# Patient Record
Sex: FEMALE | Race: WHITE | Employment: FULL TIME | URBAN - METROPOLITAN AREA
[De-identification: names, ages, dates, MRNs, and addresses within clinical notes are randomized per-mention and may not be internally consistent; named-entity substitution may affect disease eponyms.]

---

## 2017-02-13 ENCOUNTER — OFFICE VISIT (OUTPATIENT)
Dept: URGENT CARE | Facility: RETAIL CLINIC | Age: 47
End: 2017-02-13
Payer: COMMERCIAL

## 2017-02-13 VITALS — SYSTOLIC BLOOD PRESSURE: 112 MMHG | TEMPERATURE: 97.9 F | HEART RATE: 61 BPM | DIASTOLIC BLOOD PRESSURE: 69 MMHG

## 2017-02-13 DIAGNOSIS — G44.209 TENSION HEADACHE: Primary | ICD-10-CM

## 2017-02-13 DIAGNOSIS — H69.91 DYSFUNCTION OF EUSTACHIAN TUBE, RIGHT: ICD-10-CM

## 2017-02-13 PROCEDURE — 99202 OFFICE O/P NEW SF 15 MIN: CPT | Performed by: PHYSICIAN ASSISTANT

## 2017-02-13 NOTE — NURSING NOTE
Chief Complaint   Patient presents with     Otalgia     symptoms worsened the past 3 days; has had a cold; Right     Sinus Problem     Right     Generalized Body Aches     Right side       Initial /69 (BP Location: Left arm)  Pulse 61  Temp 97.9  F (36.6  C) (Oral) There is no height or weight on file to calculate BMI.  Medication Reconciliation: complete

## 2017-02-13 NOTE — MR AVS SNAPSHOT
"              After Visit Summary   2017    Kate Green    MRN: [id number]           Patient Information     Date Of Birth          1970        Visit Information        Provider Department      2017 12:20 PM Debbie Kim PA-C [facility] [place]        Today's Diagnoses     Tension headache    -  1    Dysfunction of eustachian tube, right           Follow-ups after your visit        Who to contact     You can reach your care team any time of the day by calling [phone].  Notification of test results:  If you have an abnormal lab result, we will notify you by phone as soon as possible.         Additional Information About Your Visit        MyChart Information     [employer] lets you send messages to your doctor, view your test results, renew your prescriptions, schedule appointments and more. To sign up, go to [email or web address] . Click on \"Log in\" on the left side of the screen, which will take you to the Welcome page. Then click on \"Sign up Now\" on the right side of the page.     You will be asked to enter the access code listed below, as well as some personal information. Please follow the directions to create your username and password.     Your access code is: [email or web address]  Expires: 2017  1:45 PM     Your access code will  in 90 days. If you need help or a new code, please call your [place] clinic or [phone].        Care EveryWhere ID     This is your Care EveryWhere ID. This could be used by other organizations to access your [place] medical records  [id number]        Your Vitals Were     Pulse Temperature                61 97.9  F (36.6  C) (Oral)           Blood Pressure from Last 3 Encounters:   17 112/69    Weight from Last 3 Encounters:   No data found for Wt              Today, you had the following     No orders found for display       Primary Care Provider    None Specified       No primary provider on file.        Thank you!     " Thank you for choosing [facility]  for your care. Our goal is always to provide you with excellent care. Hearing back from our patients is one way we can continue to improve our services. Please take a few minutes to complete the written survey that you may receive in the mail after your visit with us. Thank you!             Your Updated Medication List - Protect others around you: Learn how to safely use, store and throw away your medicines at www.disposemymeds.org.          This list is accurate as of: 2/13/17  1:45 PM.  Always use your most recent med list.                   Brand Name Dispense Instructions for use    IBUPROFEN PO          SYNTHROID PO          TYLENOL PO          ZOLOFT PO      Take by mouth daily

## 2017-02-13 NOTE — PROGRESS NOTES
Chief Complaint   Patient presents with     Otalgia     symptoms worsened the past 3 days; has had a cold; Right     Sinus Problem     Right     Generalized Body Aches     Right side        SUBJECTIVE:   Kate Green is a 46 year old female presenting with a chief complaint of pressure on R lateral side of head  Onset of symptoms was 2 day(s) ago.  Course of illness is same.    Severity ranges from mild- moderate  Current and Associated symptoms headache, R ear pressure,  achy, cold sxs for past weeks  Treatment measures tried include tylenol, ibuprofen  Predisposing factors include headaches, goes to chiropractor for adjustments, has had a few AOMs in past as an adult    No past medical history on file.  Current Outpatient Prescriptions   Medication Sig Dispense Refill     Sertraline HCl (ZOLOFT PO) Take by mouth daily       Levothyroxine Sodium (SYNTHROID PO)        Acetaminophen (TYLENOL PO)        IBUPROFEN PO         No Known Allergies     Social History   Substance Use Topics     Smoking status: Not on file     Smokeless tobacco: Not on file     Alcohol use Not on file       ROS:  Review of systems negative except as stated above.    OBJECTIVE:  /69 (BP Location: Left arm)  Pulse 61  Temp 97.9  F (36.6  C) (Oral)  GENERAL APPEARANCE: healthy, alert and no distress  EYES: conjunctiva clear  HENT: pressure points along head. ear canals clear. L TM gray and shiny. R TM gray with small amt of serous effusion.  Nose pink, no discharge or inflammation. mouth without ulcers, erythema or lesions  NECK: supple, nontender, no lymphadenopathy  RESP: lungs clear to auscultation - no rales, rhonchi or wheezes  CV: regular rates and rhythm, normal S1 S2, no murmur noted  SKIN: no suspicious lesions or rashes    ASSESSMENT:  Tension headache  Eustachian tube dysfunction, mild, right    PLAN:  Advised no sign of sinus infection or indication for antibiotics today.  Recommend symptomatic measures for tension headache  and a trial for ETD such as a decongestant   If persistent symptoms, please follow up with primary care provider. She agrees with this plan.    Debbie Kim PA-C  [facility]

## 2019-03-13 ENCOUNTER — TRANSFERRED RECORDS (OUTPATIENT)
Dept: HEALTH INFORMATION MANAGEMENT | Facility: CLINIC | Age: 49
End: 2019-03-13

## 2021-03-31 ENCOUNTER — TRANSFERRED RECORDS (OUTPATIENT)
Dept: HEALTH INFORMATION MANAGEMENT | Facility: CLINIC | Age: 51
End: 2021-03-31

## 2021-04-05 ENCOUNTER — TRANSFERRED RECORDS (OUTPATIENT)
Dept: HEALTH INFORMATION MANAGEMENT | Facility: CLINIC | Age: 51
End: 2021-04-05

## 2021-04-29 ENCOUNTER — TRANSFERRED RECORDS (OUTPATIENT)
Dept: HEALTH INFORMATION MANAGEMENT | Facility: CLINIC | Age: 51
End: 2021-04-29

## 2021-05-07 ENCOUNTER — TRANSFERRED RECORDS (OUTPATIENT)
Dept: HEALTH INFORMATION MANAGEMENT | Facility: CLINIC | Age: 51
End: 2021-05-07

## 2022-05-13 ENCOUNTER — TRANSFERRED RECORDS (OUTPATIENT)
Dept: HEALTH INFORMATION MANAGEMENT | Facility: CLINIC | Age: 52
End: 2022-05-13

## 2022-08-12 ENCOUNTER — TRANSFERRED RECORDS (OUTPATIENT)
Dept: HEALTH INFORMATION MANAGEMENT | Facility: CLINIC | Age: 52
End: 2022-08-12

## 2023-05-23 ENCOUNTER — TRANSFERRED RECORDS (OUTPATIENT)
Dept: HEALTH INFORMATION MANAGEMENT | Facility: CLINIC | Age: 53
End: 2023-05-23

## 2023-12-19 ENCOUNTER — TRANSFERRED RECORDS (OUTPATIENT)
Dept: HEALTH INFORMATION MANAGEMENT | Facility: CLINIC | Age: 53
End: 2023-12-19

## 2023-12-28 ENCOUNTER — TRANSFERRED RECORDS (OUTPATIENT)
Dept: HEALTH INFORMATION MANAGEMENT | Facility: CLINIC | Age: 53
End: 2023-12-28

## 2024-02-28 ENCOUNTER — TRANSFERRED RECORDS (OUTPATIENT)
Dept: HEALTH INFORMATION MANAGEMENT | Facility: CLINIC | Age: 54
End: 2024-02-28
Payer: COMMERCIAL

## 2024-03-06 ENCOUNTER — TRANSFERRED RECORDS (OUTPATIENT)
Dept: HEALTH INFORMATION MANAGEMENT | Facility: CLINIC | Age: 54
End: 2024-03-06
Payer: COMMERCIAL

## 2024-03-08 ENCOUNTER — TRANSFERRED RECORDS (OUTPATIENT)
Dept: HEALTH INFORMATION MANAGEMENT | Facility: CLINIC | Age: 54
End: 2024-03-08
Payer: COMMERCIAL

## 2024-03-18 ENCOUNTER — TRANSFERRED RECORDS (OUTPATIENT)
Dept: HEALTH INFORMATION MANAGEMENT | Facility: CLINIC | Age: 54
End: 2024-03-18
Payer: COMMERCIAL

## 2024-03-20 ENCOUNTER — TRANSFERRED RECORDS (OUTPATIENT)
Dept: HEALTH INFORMATION MANAGEMENT | Facility: CLINIC | Age: 54
End: 2024-03-20
Payer: COMMERCIAL

## 2024-03-25 ENCOUNTER — TRANSFERRED RECORDS (OUTPATIENT)
Dept: HEALTH INFORMATION MANAGEMENT | Facility: CLINIC | Age: 54
End: 2024-03-25
Payer: COMMERCIAL

## 2024-04-03 ENCOUNTER — OFFICE VISIT (OUTPATIENT)
Dept: OPHTHALMOLOGY | Facility: CLINIC | Age: 54
End: 2024-04-03
Attending: OPHTHALMOLOGY
Payer: COMMERCIAL

## 2024-04-03 DIAGNOSIS — H15.013 ANTERIOR SCLERITIS OF BOTH EYES: Primary | ICD-10-CM

## 2024-04-03 PROBLEM — R00.1 SINUS BRADYCARDIA: Status: ACTIVE | Noted: 2023-06-03

## 2024-04-03 PROBLEM — I95.1 ORTHOSTATIC HYPOTENSION: Status: ACTIVE | Noted: 2023-07-10

## 2024-04-03 PROBLEM — I49.3 PVC (PREMATURE VENTRICULAR CONTRACTION): Status: ACTIVE | Noted: 2023-07-10

## 2024-04-03 PROBLEM — I42.8 NONISCHEMIC CARDIOMYOPATHY (H): Status: ACTIVE | Noted: 2023-09-20

## 2024-04-03 PROBLEM — E03.9 HYPOTHYROIDISM (ACQUIRED): Status: ACTIVE | Noted: 2018-12-20

## 2024-04-03 PROCEDURE — 99213 OFFICE O/P EST LOW 20 MIN: CPT | Performed by: OPHTHALMOLOGY

## 2024-04-03 PROCEDURE — 92004 COMPRE OPH EXAM NEW PT 1/>: CPT | Mod: GC | Performed by: OPHTHALMOLOGY

## 2024-04-03 RX ORDER — TRAZODONE HYDROCHLORIDE 50 MG/1
TABLET, FILM COATED ORAL
COMMUNITY
Start: 2023-10-26

## 2024-04-03 RX ORDER — METOPROLOL SUCCINATE 25 MG/1
25 TABLET, EXTENDED RELEASE ORAL DAILY
COMMUNITY

## 2024-04-03 RX ORDER — PREDNISONE 20 MG/1
3 TABLET ORAL
COMMUNITY
Start: 2024-03-18 | End: 2024-05-13

## 2024-04-03 ASSESSMENT — TONOMETRY
IOP_METHOD: TONOPEN
OS_IOP_MMHG: 12
OD_IOP_MMHG: 13

## 2024-04-03 ASSESSMENT — REFRACTION_WEARINGRX
OS_CYLINDER: +1.50
OD_SPHERE: -11.00
OS_SPHERE: -12.50
OD_CYLINDER: +1.00
OD_ADD: +2.25
OS_AXIS: 010
OS_ADD: +2.25
OD_AXIS: 155

## 2024-04-03 ASSESSMENT — CONF VISUAL FIELD
METHOD: COUNTING FINGERS
OS_SUPERIOR_TEMPORAL_RESTRICTION: 0
OD_NORMAL: 1
OD_INFERIOR_NASAL_RESTRICTION: 0
OS_INFERIOR_NASAL_RESTRICTION: 0
OD_SUPERIOR_NASAL_RESTRICTION: 0
OD_INFERIOR_TEMPORAL_RESTRICTION: 0
OS_INFERIOR_TEMPORAL_RESTRICTION: 0
OS_SUPERIOR_NASAL_RESTRICTION: 0
OS_NORMAL: 1
OD_SUPERIOR_TEMPORAL_RESTRICTION: 0

## 2024-04-03 ASSESSMENT — SLIT LAMP EXAM - LIDS
COMMENTS: NORMAL, NON-TENDER
COMMENTS: NORMAL, NON-TENDER

## 2024-04-03 ASSESSMENT — CUP TO DISC RATIO
OD_RATIO: 0.1
OS_RATIO: 0.1

## 2024-04-03 ASSESSMENT — VISUAL ACUITY
OD_PH_CC: 20/25
METHOD: SNELLEN - LINEAR
OS_CC: 20/50
OD_CC: 20/40
OD_CC+: +1
OD_PH_CC+: -2
CORRECTION_TYPE: GLASSES
OS_PH_CC: 20/30

## 2024-04-03 NOTE — PATIENT INSTRUCTIONS
Continue these medications: Prednisone 10mg for 3 more days (last Friday, 4/5/24). Then starting on Saturday, take equivalent of 5 mg each day for one week (stop Sunday, 4/14/24) or stay on that dose if things are still red/uncomfortable  No methotrexate or other medications at this time  Okay to resume full time contact lens wear when you feel comfortable

## 2024-04-03 NOTE — LETTER
4/3/2024       RE: Kate Green  [address] [place] [address]     Dear Colleague,    Thank you for referring your patient, Kate Green, to the [facility] [place] at [facility]. Please see a copy of my visit note below.    Chief Complaint/Presenting Concern: Uveitis evaluation.    History of Present Illness:   Kate Green is a 53 year old patient who presents for evaluation of anterior scleritis. This started suddenly around 6 weeks ago following an illness with dry cough and fever. She tested negative for COVID at the time and an oral antibiotic for an ear infection.    Ms. Green was initially given a diagnosis and treated as pink eye but did not get better with various antibiotic drops.    Ultimately, a diagnosis of anterior scleritis was made, labs ordered.  She was also treated with 800mg ibuprofen which she took once along with prednisolone acetate drops. The symptoms got significantly worse and so oral prednisone started at 60 mg daily and has been tapered down to the current dose of 10 mg daily. She started noticing a significant improvement after about 2 weeks. Rheumatology consultation was discussed but not coordinated.    She reports that she was diagnosed with COVID in 2021 and at the same time suffered from inability to close her right eyelids without total hemifacial paralysis.    Today, Kate Green notes things are doing well on this dose of prednisone.     Additional Ocular History:   No history of eye inflammation  Contact lens wear  Denies history of eye surgery, laser procedures or trauma    Relevant Past Medical/Family/Social History:  No personal history of autoimmune disease  History of chicken pox and shingles of the groin area (denies face involvement) and shingles vaccine.   History of chronic diarrhea that has resolved 6-12 months ago    Mother with Crohn's and connective tissue disease  and a sister with rheumatoid arthritis, another sister with grave disease and hashimotos    Here with her . 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    She reports she has a tattoo with no redness or irritation of the skin around the tattoo. [place] She owns cats and dogs but none new    She denies known exposure to TB or time spent in halfway or longterm, living in another country, travel outside of the [place] within the last 1 year, IVDU, syphilis diagnosis, known tick bites, consumption of raw meat.     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   Denies any problems with breathing.No more cough  3. Sleep has been difficult on prednisone and also some jitteriness and lightheadedness    Labs: Normal/negative 3/2024: CBC, ESR, CRP, KARSTEN, ANCA, ACE, RF     Current eye related medications: Prednisone 10 mg daily (this dose for 3 days), no drops    Retina/Uveitis Imaging: None    Assessment:    1. Anterior scleritis of both eyes  Significant improvement in symptoms on oral prednisone with minimal findings on exam today in both eyes.    Plan/Recommendations:  Discussed findings with patient and her . There has been a significant improvement in symptoms on oral prednisone with minimal findings on exam today in either eye. We will try to taper prednisone and see how things go.   Eye pressure is good today and dilated exam is healthy.  Recommend additional diagnostic testing: None at this time given improvement, no other symptoms, and things getting better on prednisone.   Let's taper the prednisone. Take 10mg for 3 more days (last Friday, 4/5/24). Then starting on Saturday, take equivalent of 5 mg each day for one week (stop Sunday, 4/14/24) or stay on that dose if things are still red/uncomfortable.  No methotrexate or  other medications at this time.    RTC April 19 as scheduled at [facility] with Dr. Dolan    May 1 or 8 tonopen, no dilation, no testing    Denny Coon MD MPH  Vitreoretinal Fellow PGY-6  [facility]   Attending Physician Attestation:  Complete documentation of historical and exam elements from today's encounter can be found in the full encounter summary report (not reduplicated in this progress note). I reviewed the chief complaint(s) and history of present illness, and  confirmed and edited as necessary the review of systems, past medical/surgical history, family history, social history, and examination findings as documented by others and the treating Resident or Fellow Physician.    I examined the patient myself, discussed the findings, reviewed all ancillary testing data and modified these results and reports along with the assessment and plan with the Treating Resident or Fellow Physician. I agree with the note as detailed above.   Enio Mcdonald M.D.  Uveitis and Medical Retina  April 3, 2024      Again, thank you for allowing me to participate in the care of your patient.      Sincerely,    Enio Mcdonald MD  [facility] Dept of Ophthalmology  Uveitis and Medical Retina

## 2024-04-03 NOTE — NURSING NOTE
Chief Complaints and History of Present Illnesses   Patient presents with    Scleritis Evaluation     Chief Complaint(s) and History of Present Illness(es)       Scleritis Evaluation              Laterality: both eyes    Onset: gradual    Onset: months ago    Quality: States that since the taper of steroids the va has increased      Associated symptoms: dryness, redness, tearing, photophobia and floaters.  Negative for flashes    Treatments tried: no treatments and artificial tears    Pain scale: 0/10              Comments    Here for anterior scleritis that started about 6 weeks ago each eye   AT prn  Prednisone 10 mg   Oly Forbes COT 8:46 AM April 3, 2024

## 2024-04-03 NOTE — PROGRESS NOTES
Chief Complaint/Presenting Concern: Uveitis evaluation    History of Present Illness:   Kate Green is a 53 year old patient who presents for evaluation of anterior scleritis. This started suddenly around 6 weeks ago following an illness with dry cough and fever. She tested negative for COVID at the time and an oral antibiotic for an ear infection    Ms. Green was initially given a diagnosis and treated as pink eye but did not get better with various antibiotic drops.    Ultimately, a diagnosis of anterior scleritis was made, labs ordered.  She was also treated with 800mg ibuprofen which she took once along with prednisolone acetate drops. The symptoms got significantly worse and so oral prednisone started at 60 mg daily and has been tapered down to the current dose of 10 mg daily. She started noticing a significant improvement after about 2 weeks. Rheumatology consultation was discussed but not coordinated.    She reports that she was diagnosed with COVID in 2021 and at the same time suffered from inability to close her right eyelids without total hemifacial paralysis.    Today, Kate Green notes things are doing well on this dose of prednisone.     Additional Ocular History:   No history of eye inflammation  Contact lens wear  Denies history of eye surgery, laser procedures or trauma    Relevant Past Medical/Family/Social History:  No personal history of autoimmune disease  History of chicken pox and shingles of the groin area (denies face involvement) and shingles vaccine.   History of chronic diarrhea that has resolved 6-12 months ago    Mother with Crohn's and connective tissue disease and a sister with rheumatoid arthritis, another sister with grave disease and hashimotos    Here with her . 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    She reports she has a tattoo with no redness or irritation of the skin around the tattoo. [place]. She  owns cats and dogs but none new    She denies known exposure to TB or time spent in residential or [facility], living in another country, travel outside of the [place] within the last 1 year, IVDU, syphilis diagnosis, known tick bites, consumption of raw meat.     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   Denies any problems with breathing.No more cough  3. Sleep has been difficult on prednisone and also some jitteriness and lightheadedness    Labs: Normal/negative 3/2024: CBC, ESR, CRP, KARSTEN, ANCA, ACE, RF     Current eye related medications: Prednisone 10 mg daily (this dose for 3 days), no drops    Retina/Uveitis Imaging: None    Assessment:    1. Anterior scleritis of both eyes  Significant improvement in symptoms on oral prednisone with minimal findings on exam today in both eyes    Plan/Recommendations:  Discussed findings with patient and her . There has been a significant improvement in symptoms on oral prednisone with minimal findings on exam today in either eye. We will try to taper prednisone and see how things gof.   Eye pressure is good today and dilated exam is healthy  Recommend additional diagnostic testing: None at this time given improvement, no other symptoms, and things getting better on prednisone.   Let's taper the prednisone. Take 10mg for 3 more days (last Friday, 4/5/24). Then starting on Saturday, take equivalent of 5 mg each day for one week (stop Sunday, 4/14/24) or stay on that dose if things are still red/uncomfortable  No methotrexate or other medications at this time    RTC April 19 as scheduled at [facility] with Dr. Dolan    May 1 or 8 tonopen, no dilation, no testing    Denny Coon MD MPH  Vitreoretinal Fellow PGY-6  [facility]     Attending Physician Attestation:  Complete documentation of historical and exam elements from  today's encounter can be found in the full encounter summary report (not reduplicated in this progress note). I reviewed the chief complaint(s) and history of present illness, and  confirmed and edited as necessary the review of systems, past medical/surgical history, family history, social history, and examination findings as documented by others and the treating Resident or Fellow Physician.    I examined the patient myself, discussed the findings, reviewed all ancillary testing data and modified these results and reports along with the assessment and plan with the Treating Resident or Fellow Physician. I agree with the note as detailed above.   Enio Mcdonald M.D.  Uveitis and Medical Retina  April 3, 2024

## 2024-05-13 ENCOUNTER — OFFICE VISIT (OUTPATIENT)
Dept: OPHTHALMOLOGY | Facility: CLINIC | Age: 54
End: 2024-05-13
Attending: OPHTHALMOLOGY
Payer: COMMERCIAL

## 2024-05-13 DIAGNOSIS — H15.013 ANTERIOR SCLERITIS OF BOTH EYES: Primary | ICD-10-CM

## 2024-05-13 PROCEDURE — 99213 OFFICE O/P EST LOW 20 MIN: CPT | Performed by: OPHTHALMOLOGY

## 2024-05-13 PROCEDURE — 99214 OFFICE O/P EST MOD 30 MIN: CPT | Performed by: OPHTHALMOLOGY

## 2024-05-13 RX ORDER — PREDNISOLONE ACETATE 10 MG/ML
SUSPENSION/ DROPS OPHTHALMIC
Qty: 10 ML | Refills: 3 | Status: SHIPPED | OUTPATIENT
Start: 2024-05-13

## 2024-05-13 ASSESSMENT — CONF VISUAL FIELD
OD_INFERIOR_TEMPORAL_RESTRICTION: 0
OS_SUPERIOR_TEMPORAL_RESTRICTION: 0
OD_SUPERIOR_NASAL_RESTRICTION: 0
OS_INFERIOR_NASAL_RESTRICTION: 0
OD_SUPERIOR_TEMPORAL_RESTRICTION: 0
OS_NORMAL: 1
OD_NORMAL: 1
OS_SUPERIOR_NASAL_RESTRICTION: 0
METHOD: COUNTING FINGERS
OD_INFERIOR_NASAL_RESTRICTION: 0
OS_INFERIOR_TEMPORAL_RESTRICTION: 0

## 2024-05-13 ASSESSMENT — VISUAL ACUITY
METHOD: SNELLEN - LINEAR
OS_CC: 20/30
OD_CC: 20/30
CORRECTION_TYPE: CONTACTS

## 2024-05-13 ASSESSMENT — SLIT LAMP EXAM - LIDS
COMMENTS: NORMAL, NON-TENDER
COMMENTS: NORMAL, NON-TENDER

## 2024-05-13 ASSESSMENT — CUP TO DISC RATIO
OS_RATIO: 0.2
OD_RATIO: 0.2

## 2024-05-13 ASSESSMENT — TONOMETRY
IOP_METHOD: TONOPEN
OD_IOP_MMHG: 15
OS_IOP_MMHG: 12

## 2024-05-13 NOTE — PATIENT INSTRUCTIONS
Recommend additional testing. We placed another consultation to Rheumatology at [facility] to see if there may be any underlying condition causing inflammation of the eye.  Let me know how things go and we can consider a CT scan of the head if things do not get better.   Continue Systane drops for dryness  Let's try some steroid drops Both eyes: 1 drop 3x/day 3 days, then 1 drop 2x/day for 3 days, then 1 drop daily for 10 days then stop at the end of May 2024. If things are fine, okay to do steroid drop 1-2x/day but if needed for more than a month, let's do a recheck.   No prednisone pills needed

## 2024-05-13 NOTE — PROGRESS NOTES
Chief Complaint/Presenting Concern:  Uveitis evaluation    Interval History of Present Ocular Illness:  Kate Green is a 53 year old patient who returns for follow up of her anterior scleritis. Last visit we recommended tapering prednisone.     Kate Green reports that she did well initially when tapering off the steroid pills including at her visit with Dr. Dolan. The symptoms started coming back a few weeks ago (two weeks off prednisone). Things are not as bad as before but the eyes still feel different mostly in the right eye but somewhat in the left eye.     Interval Updates to Medical/Family/Social History:  No updates    Relevant Review of Systems Updates:  No health issues    Labs: None since last visit    Current eye related medications: No prednisone for 1 month, Systane 1-2x/day each eye     Retina/Uveitis Imaging: None today    Assessment:     1. Anterior scleritis of both eyes  Some recurrent hyperemia, no nodules, no limbal deposits    Plan/Recommendations:    Discussed findings with patient and her . There is some redness off oral prednisone but not as much as previously. We will try some steroid drops to see how things go   Eye pressure is normal in each eye. There is no optic disc edema  Recommend additional testing. We placed another consultation to Rheumatology at [facility] to see if there may be any underlying condition causing inflammation of the eye.  Let me know how things go and we can consider a CT scan of the head if things do not get better.   Continue Systane drops for dryness  Let's try some steroid drops Both eyes: 1 drop 3x/day 3 days, then 1 drop 2x/day for 3 days, then 1 drop daily for 10 days then stop at the end of May 2024. If things are fine, okay to do steroid drop 1-2x/day but if needed for more than a month, let's do a recheck.   No prednisone pills needed    RTC PRN    Physician Attestation     Attending Physician Attestation:  Complete documentation  of historical and exam elements from today's encounter can be found in the full encounter summary report (not reduplicated in this progress note). I personally obtained the chief complaint(s) and history of present illness. I confirmed and edited as necessary the review of systems, past medical/surgical history, family history, social history, and examination findings as documented by others; and I examined the patient myself. I personally reviewed the relevant tests, images, and reports as documented above. I formulated and edited as necessary the assessment and plan and discussed the findings and management plan with the patient and family members present at the time of this visit.  Enio Mcdonald M.D., Uveitis and Medical Retina, May 13, 2024

## 2024-05-13 NOTE — NURSING NOTE
Chief Complaints and History of Present Illnesses   Patient presents with    Scleritis Follow Up     Chief Complaint(s) and History of Present Illness(es)       Scleritis Follow Up              Laterality: both eyes    Onset: months ago    Quality: States va has improved since off of the pred      Severity: moderate    Frequency: intermittently    Associated symptoms: dryness, photophobia and floaters.  Negative for flashes    Treatments tried: no treatments    Pain scale: 0/10              Comments    Here for anterior scleritis of both eyes  States 2 weeks after the steroids were done as noticed stabbing pains.   Prednisone last taken 4/12  AT BID   Oly Forbes COT 1:20 PM May 13, 2024

## 2024-05-13 NOTE — LETTER
5/13/2024       RE: Kate Green  [address] [place] [address]     Dear Colleague,    Thank you for referring your patient, Kate Green, to the [facility] [place] at [facility]. Please see a copy of my visit note below.    Chief Complaint/Presenting Concern:  Uveitis evaluation    Interval History of Present Ocular Illness:  Kate Green is a 53 year old patient who returns for follow up of her anterior scleritis. Last visit we recommended tapering prednisone.     Kate Green reports that she did well initially when tapering off the steroid pills including at her visit with Dr. Dolan. The symptoms started coming back a few weeks ago (two weeks off prednisone). Things are not as bad as before but the eyes still feel different mostly in the right eye but somewhat in the left eye.     Interval Updates to Medical/Family/Social History:  No updates    Relevant Review of Systems Updates:  No health issues    Labs: None since last visit    Current eye related medications: No prednisone for 1 month, Systane 1-2x/day each eye     Retina/Uveitis Imaging: None today    Assessment:     1. Anterior scleritis of both eyes  Some recurrent hyperemia, no nodules, no limbal deposits        Plan/Recommendations:    Discussed findings with patient and her . There is some redness off oral Prednisone but not as much as previously. We will try some steroid drops to see how things go   Eye pressure is normal in each eye. There is no optic disc edema  Recommend additional testing. We placed another consultation to Rheumatology at [facility] to see if there may be any underlying condition causing inflammation of the eye.  Let me know how things go and we can consider a CT scan of the head if things do not get better.   Continue Systane drops for dryness  Let's try some steroid drops Both eyes: 1 drop 3x/day 3 days, then 1 drop  2x/day for 3 days, then 1 drop daily for 10 days then stop at the end of May 2024. If things are fine, okay to do steroid drop 1-2x/day but if needed for more than a month, let's do a recheck.   No Prednisone pills needed    RTC PRN    Physician Attestation    Attending Physician Attestation:  Complete documentation of historical and exam elements from today's encounter can be found in the full encounter summary report (not reduplicated in this progress note). I personally obtained the chief complaint(s) and history of present illness. I confirmed and edited as necessary the review of systems, past medical/surgical history, family history, social history, and examination findings as documented by others; and I examined the patient myself. I personally reviewed the relevant tests, images, and reports as documented above. I formulated and edited as necessary the assessment and plan and discussed the findings and management plan with the patient and family members present at the time of this visit.  Enio Mcdonald M.D., Uveitis and Medical Retina, May 13, 2024     Again, thank you for allowing me to participate in the care of your patient.      Sincerely,    Enio Mcdonald MD  [facility] Dept of Ophthalmology  Uveitis and Medical Retina

## 2024-11-30 ENCOUNTER — HEALTH MAINTENANCE LETTER (OUTPATIENT)
Age: 54
End: 2024-11-30

## 2025-06-28 ENCOUNTER — HEALTH MAINTENANCE LETTER (OUTPATIENT)
Age: 55
End: 2025-06-28